# Patient Record
Sex: MALE | Race: WHITE
[De-identification: names, ages, dates, MRNs, and addresses within clinical notes are randomized per-mention and may not be internally consistent; named-entity substitution may affect disease eponyms.]

---

## 2018-08-21 ENCOUNTER — HOSPITAL ENCOUNTER (EMERGENCY)
Dept: HOSPITAL 25 - ED | Age: 18
Discharge: HOME | End: 2018-08-21
Payer: COMMERCIAL

## 2018-08-21 VITALS — SYSTOLIC BLOOD PRESSURE: 102 MMHG | DIASTOLIC BLOOD PRESSURE: 58 MMHG

## 2018-08-21 DIAGNOSIS — F10.129: Primary | ICD-10-CM

## 2018-08-21 PROCEDURE — 99282 EMERGENCY DEPT VISIT SF MDM: CPT

## 2018-08-21 NOTE — ED
Substance Abuse/Use





- HPI Summary


HPI Summary: 


Pt. is a 18-year-old male who emergency department by the [place] police after 

being found on the side of the road intoxicated. In the ER pt. is awake, alert 

and oriented. He offers no complaints other than stating he wants to go home. 

Pt. denies past medical history. He denies daily medications. Pt. denies any 

pain or injuries. Symptoms are mild in severity. No current modifying factors. 

Pt. is a student at [employer]. 








- History Of Current Complaint


Chief Complaint: EDSubstanceAbuse


Stated Complaint: 2209


Time Seen by Provider: 08/21/18 05:38


Hx Obtained From: Patient





- Allergies/Home Medications


Allergies/Adverse Reactions: 


 Allergies











Allergy/AdvReac Type Severity Reaction Status Date / Time


 


No Known Allergies Allergy   Verified 08/21/18 07:26











Home Medications: 


 Home Medications





NK [No Home Medications Reported]  08/21/18 [History Confirmed 08/21/18]











PMH/Surg Hx/FS Hx/Imm Hx


Previously Healthy: Yes


Infectious Disease History: No


Infectious Disease History: 


   Denies: Traveled Outside the [place] in Last 30 Days





- Social History


Occupation: Student


Lives: Dormitory/Roommates


Alcohol Use: Occasionally





Review of Systems


Constitutional: Negative


Eyes: Negative


ENT: Negative


Cardiovascular: Negative


Negative: Chest Pain


Respiratory: Negative


Negative: Shortness Of Breath


Gastrointestinal: Negative


Negative: Abdominal Pain, Vomiting


Musculoskeletal: Negative


Skin: Negative


Neurological: Negative


All Other Systems Reviewed And Are Negative: Yes





Physical Exam


Triage Information Reviewed: Yes


Vital Signs On Initial Exam: 


 Initial Vitals











Temp Pulse Resp BP Pulse Ox


 


 97.6 F   109   16   158/88   97 


 


 08/21/18 05:25  08/21/18 05:25  08/21/18 05:25  08/21/18 05:25  08/21/18 05:25











Vital Signs Reviewed: Yes


Appearance: Positive: Well-Appearing - Pt. sitting up in bed in NAD. Answers 

questions appropriately.


Skin: Positive: Warm, Dry


Head/Face: Positive: Normal Head/Face Inspection


Eyes: Positive: Normal


Neck: Positive: Supple


Respiratory/Lung Sounds: Positive: Clear to Auscultation, Breath Sounds Present


Cardiovascular: Positive: Normal, RRR


Abdomen Description: Positive: Nontender, No Organomegaly


Musculoskeletal: Positive: Normal, Strength/ROM Intact


Neurological: Positive: Normal, CN Intact II-III


Psychiatric: Positive: Affect/Mood Appropriate





- [place] Coma Scale


Best Eye Response: 4 - Spontaneous


Best Motor Response: 6 - Obeys Commands


Best Verbal Response: 5 - Oriented


Coma Scale Total: 15





Diagnostics





- Vital Signs


 Vital Signs











  Temp Pulse Resp BP Pulse Ox


 


 08/21/18 05:25  97.6 F  109  16  158/88  97














- Laboratory


Lab Statement: Any lab studies that have been ordered have been reviewed, and 

results considered in the medical decision making process.





Course/Dx





- Course


Course Of Treatment: Pt. presenting for intoxication. VS stable. Pt. is awake, 

alert and O x 3. Pt. denies SI. He was observed in the ER for over 3 hours. At 

the time of discharge pt. was able to ambulate by himself without difficulty. 

He is discharged and will be taken back to [place] by taxi. Advised to avoid 

drug or ETOH use. To increase fluids. Will return to ER if needed, otherwise 

f.u with [facility].





- Diagnoses


Differential Diagnosis/HQI/PQRI: Positive: Alcohol Abuse, Alcohol Withdrawal, 

Anxiety, Depression, Drug Abuse


Provider Diagnoses: 


 Intoxication








Discharge





- Sign-Out/Discharge


Documenting (check all that apply): Patient Departure





- Discharge Plan


Condition: Good


Disposition: HOME


Patient Education Materials:  Alcohol Intoxication (ED)


Referrals: 


[facility] [Outside]


No Primary Care Phys,NOPCP [Primary Care Provider] - 


Additional Instructions: 


Avoid alcohol and drug use


Return to ER if needed





- Billing Disposition and Condition


Condition: GOOD


Disposition: Home